# Patient Record
Sex: FEMALE | URBAN - METROPOLITAN AREA
[De-identification: names, ages, dates, MRNs, and addresses within clinical notes are randomized per-mention and may not be internally consistent; named-entity substitution may affect disease eponyms.]

---

## 2020-12-13 ENCOUNTER — OUT OF OFFICE VISIT (OUTPATIENT)
Dept: URBAN - METROPOLITAN AREA MEDICAL CENTER 28 | Facility: MEDICAL CENTER | Age: 45
End: 2020-12-13
Payer: SELF-PAY

## 2020-12-13 DIAGNOSIS — U07.1 2019 NOVEL CORONAVIRUS DETECTED: ICD-10-CM

## 2020-12-13 DIAGNOSIS — K51.80 CHRONIC PANCOLONIC ULCERATIVE COLITIS: ICD-10-CM

## 2020-12-13 DIAGNOSIS — R19.7 ACUTE DIARRHEA: ICD-10-CM

## 2020-12-13 PROCEDURE — 99253 IP/OBS CNSLTJ NEW/EST LOW 45: CPT | Performed by: INTERNAL MEDICINE

## 2024-05-15 ENCOUNTER — OFFICE VISIT (OUTPATIENT)
Dept: URBAN - METROPOLITAN AREA CLINIC 82 | Facility: CLINIC | Age: 49
End: 2024-05-15
Payer: COMMERCIAL

## 2024-05-15 ENCOUNTER — DASHBOARD ENCOUNTERS (OUTPATIENT)
Age: 49
End: 2024-05-15

## 2024-05-15 VITALS
HEIGHT: 64 IN | DIASTOLIC BLOOD PRESSURE: 74 MMHG | SYSTOLIC BLOOD PRESSURE: 107 MMHG | BODY MASS INDEX: 21.1 KG/M2 | HEART RATE: 82 BPM | WEIGHT: 123.6 LBS | TEMPERATURE: 97.3 F

## 2024-05-15 DIAGNOSIS — R93.5 ABNORMAL CT OF THE ABDOMEN: ICD-10-CM

## 2024-05-15 DIAGNOSIS — M51.36 L4-L5 DISC BULGE: ICD-10-CM

## 2024-05-15 PROBLEM — 15634181000119107: Status: ACTIVE | Noted: 2024-05-15

## 2024-05-15 PROBLEM — 443700006: Status: ACTIVE | Noted: 2024-05-15

## 2024-05-15 PROCEDURE — 99244 OFF/OP CNSLTJ NEW/EST MOD 40: CPT | Performed by: INTERNAL MEDICINE

## 2024-05-15 PROCEDURE — 99204 OFFICE O/P NEW MOD 45 MIN: CPT | Performed by: INTERNAL MEDICINE

## 2024-08-01 ENCOUNTER — TELEPHONE ENCOUNTER (OUTPATIENT)
Dept: URBAN - METROPOLITAN AREA CLINIC 82 | Facility: CLINIC | Age: 49
End: 2024-08-01

## 2024-08-02 ENCOUNTER — TELEPHONE ENCOUNTER (OUTPATIENT)
Dept: URBAN - METROPOLITAN AREA CLINIC 82 | Facility: CLINIC | Age: 49
End: 2024-08-02

## 2024-08-09 ENCOUNTER — LAB OUTSIDE AN ENCOUNTER (OUTPATIENT)
Dept: URBAN - METROPOLITAN AREA CLINIC 115 | Facility: CLINIC | Age: 49
End: 2024-08-09

## 2024-08-09 ENCOUNTER — OFFICE VISIT (OUTPATIENT)
Dept: URBAN - METROPOLITAN AREA CLINIC 115 | Facility: CLINIC | Age: 49
End: 2024-08-09
Payer: COMMERCIAL

## 2024-08-09 VITALS
HEIGHT: 64 IN | BODY MASS INDEX: 21.58 KG/M2 | SYSTOLIC BLOOD PRESSURE: 158 MMHG | DIASTOLIC BLOOD PRESSURE: 109 MMHG | HEART RATE: 83 BPM | WEIGHT: 126.4 LBS | TEMPERATURE: 97.7 F

## 2024-08-09 DIAGNOSIS — R19.7 INTERMITTENT DIARRHEA: ICD-10-CM

## 2024-08-09 DIAGNOSIS — R93.5 ABNORMAL CT OF THE ABDOMEN: ICD-10-CM

## 2024-08-09 PROCEDURE — 99214 OFFICE O/P EST MOD 30 MIN: CPT

## 2024-08-09 RX ORDER — LISINOPRIL 10 MG/1
1 TABLET TABLET ORAL ONCE A DAY
Status: ACTIVE | COMMUNITY

## 2024-08-09 RX ORDER — GABAPENTIN 600 MG/1
1 TABLET TABLET, FILM COATED ORAL ONCE A DAY
Status: ACTIVE | COMMUNITY

## 2024-08-09 NOTE — HPI-TODAY'S VISIT:
8/9/24 with Ines Delcid PA-C: Pt following up for EGD/COL; pt states she says she is not getting her back surgery any time soon; only getting injections. She had a h/o gastric ulcers and anemia, states she had been punctured and had to have mesh put in. Reports mucus in stool which is sometimes clear sometimes colored (depending on what she eats) for at least 1 month, reports BMs as often as q20 mins at least once a day, incomplete evacuation. Showing pictures of mucus in the toilet, sometimes with stool. Reports low abdominal pain and rectal pain/pressure. Reports some nausea, sometimes heartburn. Appetite differs depending on her pain; would drink protein shake/gatorade if she is not hungry. Denies blood in stool, dysphagia, odynophagia, unintentional weight loss, early satiety. Denies reg NSAID use. Reports EtOH (1 drink a day, usually, sometimes 2). Daily half ppd tobacco. Denies marijuana use.  Labs from 8/7/24 showed normal BMP (had low K and Cl on 7/22/24). Last colonoscopy: 2018 for [facility] at [place]; pt to email us which doctor performed her procedures. Denies PMH of MI, stroke, seizures, BiPAP use, pacemaker/defibrillator, blood thinners, ESRD.

## 2024-08-09 NOTE — PHYSICAL EXAM GASTROINTESTINAL
Abdomen , soft, low abdominal tenderness, nondistended , no guarding or rigidity , no masses palpable

## 2024-08-09 NOTE — HPI-OTHER HISTORIES
5/15/24 with Dr. Alvarez: HAD 29TH, FIRST VISIT, DONT KNOW NEUROSURGEON, SENT HERE FOR HERNIATED DISC, HE HAS ALSO SENT TO HIM FOR SURGERY GYNECOLOGIST SENT HER HERE, BLEEDING ULCER AND BEGINGING UC AND [facility] 5 YEARS AGO. AT [facility] NOTHING IS BOTHERING EXCEPT BACK.  SIDE HURTS WHERE SHE HAD BLEEDING ULCER,  PATIENT HAD PUNCTURE ULCER, HAD METAL OR MESH AT SIDE, DOES NOT HURT ALL THE TIME EATING GOOD, BM SAME MUSH, FIBER WILL FIRM, NO RED BLOOD, 2 WEEKS AGO BLACK STOOL AND THEN WENT AWAY. NO NSAIDS, ONLY TYLENOL NO STOMACH PAIN, NO LAXATIVE, NO WT LOSS. Patient had a MRI done on 5/4/2024 for herniated disc and found to have L4-5 circumferential disc bulge. There was also wall thickening of the left colon could represent finding of colitis in the appropriate clinical setting versus some other colon pathology. Fatty infiltration of the colon wall and peristaltic change could potentially result in similar appearance.

## 2024-08-26 ENCOUNTER — LAB OUTSIDE AN ENCOUNTER (OUTPATIENT)
Dept: URBAN - METROPOLITAN AREA CLINIC 115 | Facility: CLINIC | Age: 49
End: 2024-08-26

## 2024-08-28 ENCOUNTER — TELEPHONE ENCOUNTER (OUTPATIENT)
Dept: URBAN - METROPOLITAN AREA CLINIC 115 | Facility: CLINIC | Age: 49
End: 2024-08-28

## 2024-08-28 PROBLEM — 5891000119102: Status: ACTIVE | Noted: 2024-08-28

## 2024-08-28 RX ORDER — VANCOMYCIN HYDROCHLORIDE 125 MG/1
1 CAPSULE CAPSULE ORAL
Qty: 40 CAPSULE | Refills: 0 | OUTPATIENT
Start: 2024-08-28 | End: 2024-09-07

## 2024-08-29 LAB
ADENOVIRUS F 40/41: NOT DETECTED
C. DIFFICILE TOXIN A/B, STOOL - QDX: POSITIVE
CALPROTECTIN, STOOL - QDX: (no result)
CAMPYLOBACTER: NOT DETECTED
CLOSTRIDIUM DIFFICILE: DETECTED
ENTAMOEBA HISTOLYTICA: NOT DETECTED
ENTEROAGGREGATIVE E.COLI: NOT DETECTED
ENTEROTOXIGENIC E.COLI: NOT DETECTED
ESCHERICHIA COLI O157: NOT DETECTED
GIARDIA LAMBLIA: NOT DETECTED
NOROVIRUS GI/GII: NOT DETECTED
PANCREATICELASTASE ELISA, STOOL: (no result)
ROTAVIRUS A: NOT DETECTED
SALMONELLA SPP.: NOT DETECTED
SHIGA-LIKE TOXIN PRODUCING E.COLI: NOT DETECTED
SHIGELLA SPP. / ENTEROINVASIVE E.COLI: NOT DETECTED
VIBRIO PARAHAEMOLYTICUS: NOT DETECTED
VIBRIO SPP.: NOT DETECTED
YERSINIA ENTEROCOLITICA: NOT DETECTED

## 2024-08-30 ENCOUNTER — OFFICE VISIT (OUTPATIENT)
Dept: URBAN - METROPOLITAN AREA SURGERY CENTER 13 | Facility: SURGERY CENTER | Age: 49
End: 2024-08-30

## 2024-09-05 ENCOUNTER — OFFICE VISIT (OUTPATIENT)
Dept: URBAN - METROPOLITAN AREA SURGERY CENTER 13 | Facility: SURGERY CENTER | Age: 49
End: 2024-09-05

## 2024-09-11 ENCOUNTER — TELEPHONE ENCOUNTER (OUTPATIENT)
Dept: URBAN - METROPOLITAN AREA CLINIC 115 | Facility: CLINIC | Age: 49
End: 2024-09-11

## 2024-09-11 RX ORDER — VANCOMYCIN HYDROCHLORIDE 125 MG/1
AS DIRECTED CAPSULE ORAL
Qty: 2 | Refills: 0 | OUTPATIENT
Start: 2024-09-12 | End: 2024-09-13

## 2024-09-19 ENCOUNTER — TELEPHONE ENCOUNTER (OUTPATIENT)
Dept: URBAN - METROPOLITAN AREA CLINIC 115 | Facility: CLINIC | Age: 49
End: 2024-09-19

## 2024-10-09 ENCOUNTER — OFFICE VISIT (OUTPATIENT)
Dept: URBAN - METROPOLITAN AREA CLINIC 115 | Facility: CLINIC | Age: 49
End: 2024-10-09

## 2024-10-11 ENCOUNTER — TELEPHONE ENCOUNTER (OUTPATIENT)
Dept: URBAN - METROPOLITAN AREA CLINIC 115 | Facility: CLINIC | Age: 49
End: 2024-10-11

## 2024-10-25 ENCOUNTER — TELEPHONE ENCOUNTER (OUTPATIENT)
Dept: URBAN - METROPOLITAN AREA CLINIC 42 | Facility: CLINIC | Age: 49
End: 2024-10-25

## 2024-11-05 ENCOUNTER — OFFICE VISIT (OUTPATIENT)
Dept: URBAN - METROPOLITAN AREA SURGERY CENTER 13 | Facility: SURGERY CENTER | Age: 49
End: 2024-11-05
Payer: COMMERCIAL

## 2024-11-05 DIAGNOSIS — Z12.11 COLON CANCER SCREENING: ICD-10-CM

## 2024-11-05 DIAGNOSIS — K63.89 OTHER SPECIFIED DISEASES OF INTESTINE: ICD-10-CM

## 2024-11-05 DIAGNOSIS — R93.3 ABNORMAL FINDINGS ON DIAGNOSTIC IMAGING OF OTHER PARTS OF DIGESTIVE TRACT: ICD-10-CM

## 2024-11-05 PROCEDURE — 45378 DIAGNOSTIC COLONOSCOPY: CPT | Performed by: INTERNAL MEDICINE

## 2024-11-05 PROCEDURE — 00812 ANES LWR INTST SCR COLSC: CPT | Performed by: ANESTHESIOLOGY

## 2024-11-05 PROCEDURE — 00812 ANES LWR INTST SCR COLSC: CPT | Performed by: ANESTHESIOLOGIST ASSISTANT

## 2024-11-05 RX ORDER — LISINOPRIL 10 MG/1
1 TABLET TABLET ORAL ONCE A DAY
Status: ACTIVE | COMMUNITY

## 2024-11-05 RX ORDER — GABAPENTIN 600 MG/1
1 TABLET TABLET, FILM COATED ORAL ONCE A DAY
Status: ACTIVE | COMMUNITY

## 2025-03-28 ENCOUNTER — OFFICE VISIT (OUTPATIENT)
Dept: URBAN - METROPOLITAN AREA CLINIC 115 | Facility: CLINIC | Age: 50
End: 2025-03-28

## 2025-04-07 ENCOUNTER — OFFICE VISIT (OUTPATIENT)
Dept: URBAN - METROPOLITAN AREA CLINIC 115 | Facility: CLINIC | Age: 50
End: 2025-04-07

## 2025-04-07 ENCOUNTER — OFFICE VISIT (OUTPATIENT)
Dept: URBAN - METROPOLITAN AREA CLINIC 115 | Facility: CLINIC | Age: 50
End: 2025-04-07
Payer: COMMERCIAL

## 2025-04-07 DIAGNOSIS — A04.72 C. DIFFICILE DIARRHEA: ICD-10-CM

## 2025-04-07 DIAGNOSIS — R19.7 INTERMITTENT DIARRHEA: ICD-10-CM

## 2025-04-07 DIAGNOSIS — K57.90 DIVERTICULOSIS: ICD-10-CM

## 2025-04-07 DIAGNOSIS — R93.5 ABNORMAL CT OF THE ABDOMEN: ICD-10-CM

## 2025-04-07 PROBLEM — 397881000: Status: ACTIVE | Noted: 2025-04-07

## 2025-04-07 PROCEDURE — 99213 OFFICE O/P EST LOW 20 MIN: CPT | Performed by: INTERNAL MEDICINE

## 2025-04-07 RX ORDER — GABAPENTIN 600 MG/1
1 TABLET TABLET, FILM COATED ORAL ONCE A DAY
Status: DISCONTINUED | COMMUNITY

## 2025-04-07 RX ORDER — ATORVASTATIN CALCIUM 20 MG/1
1 TABLET TABLET, FILM COATED ORAL ONCE A DAY
Status: ACTIVE | COMMUNITY

## 2025-04-07 RX ORDER — GABAPENTIN 600 MG/1
1 TABLET TABLET, FILM COATED ORAL ONCE A DAY
Status: ACTIVE | COMMUNITY

## 2025-04-07 RX ORDER — CELECOXIB 100 MG/1
1 CAP ORAL DAILY,INSTR:CONTENTS OF CAPSULE MAY BE MIXED WITH SOFT FOODS SUCH AS APPLESAUCE CAPSULE ORAL
Qty: 30 EACH | Refills: 0 | Status: ACTIVE | COMMUNITY

## 2025-04-07 RX ORDER — BUDESONIDE AND FORMOTEROL FUMARATE DIHYDRATE 160; 4.5 UG/1; UG/1
INHALE 2 PUFFS TWICE A DAY BY INHALATION ROUTE, FOR EMPHYSEMA AEROSOL RESPIRATORY (INHALATION)
Qty: 10.2 GRAM | Refills: 0 | Status: ACTIVE | COMMUNITY

## 2025-04-07 RX ORDER — FLUTICASONE PROPIONATE 50 UG/1
SPRAY, METERED NASAL
Qty: 16 GRAM | Status: ACTIVE | COMMUNITY

## 2025-04-07 RX ORDER — PREGABALIN 100 MG/1
TAKE 1 CAPSULE BY MOUTH THREE TIMES A DAY CAPSULE ORAL
Qty: 90 EACH | Refills: 0 | Status: ACTIVE | COMMUNITY

## 2025-04-07 RX ORDER — ALBUTEROL SULFATE 108 UG/1
INHALE 2 PUFFS EVERY 4-6 HOURS BY INHALATION ROUTE AS NEEDED, FOR EMPHYSEMA SYMPTOMS INHALANT RESPIRATORY (INHALATION)
Qty: 6.7 GRAM | Refills: 0 | Status: ACTIVE | COMMUNITY

## 2025-04-07 RX ORDER — LISINOPRIL 10 MG/1
TAKE 1 TABLET TWICE A DAY BY ORAL ROUTE FOR 90 DAYS, FOR BLOOD PRESSURE TABLET ORAL
Qty: 180 EACH | Refills: 0 | Status: ACTIVE | COMMUNITY

## 2025-04-07 RX ORDER — PREDNISONE 20 MG/1
TAKE 2 TABLETS BY MOUTH EVERY DAY FOR 5 DAYS TABLET ORAL
Qty: 10 EACH | Refills: 0 | Status: ACTIVE | COMMUNITY

## 2025-04-07 RX ORDER — MELOXICAM 7.5 MG/1
TAKE 1 TABLET BY MOUTH EVERY DAY FOR 10 DAYS TABLET ORAL
Qty: 10 EACH | Refills: 0 | Status: ACTIVE | COMMUNITY

## 2025-04-07 RX ORDER — VARENICLINE 0.5 MG/1
1 TABLET AFTER EATING TABLET, FILM COATED ORAL TWICE A DAY
Status: ACTIVE | COMMUNITY

## 2025-04-07 RX ORDER — LISINOPRIL 10 MG/1
1 TABLET TABLET ORAL ONCE A DAY
Status: ACTIVE | COMMUNITY

## 2025-04-07 RX ORDER — DOXYCYCLINE HYCLATE 100 MG/1
TAKE 1 CAPSULE BY MOUTH TWICE A DAY FOR 5 DAYS CAPSULE ORAL
Qty: 10 EACH | Refills: 0 | Status: ACTIVE | COMMUNITY

## 2025-04-07 RX ORDER — ATORVASTATIN CALCIUM 20 MG/1
TAKE 1 TABLET EVERY DAY BY ORAL ROUTE IN THE EVENING, FOR CHOLESTEROL TABLET, FILM COATED ORAL
Qty: 30 EACH | Refills: 0 | Status: ACTIVE | COMMUNITY

## 2025-04-07 NOTE — HPI-TODAY'S VISIT:
blockage on ct,   feels like miguele,in esophagus, now clearen  was vomiting, , now better  now constipation'  ct done [facility], [place], and was abnormal,   never had sugery for back, going to have, needs bone density test.   seen gynecologist and every thing okay, now left ovary abnorjmal

## 2025-04-23 ENCOUNTER — OFFICE VISIT (OUTPATIENT)
Dept: URBAN - METROPOLITAN AREA CLINIC 115 | Facility: CLINIC | Age: 50
End: 2025-04-23